# Patient Record
Sex: FEMALE | Race: WHITE | Employment: OTHER | URBAN - METROPOLITAN AREA
[De-identification: names, ages, dates, MRNs, and addresses within clinical notes are randomized per-mention and may not be internally consistent; named-entity substitution may affect disease eponyms.]

---

## 2022-03-10 ENCOUNTER — HOSPITAL ENCOUNTER (OUTPATIENT)
Age: 37
Discharge: HOME | End: 2022-03-10
Payer: MEDICAID

## 2022-03-10 PROCEDURE — 97802 MEDICAL NUTRITION INDIV IN: CPT

## 2022-03-10 NOTE — PROGRESS NOTES
NUTRITION THERAPY ASSESSMENT     Referring Physician: Israel Gomez   PCP: Hilton Hooker  Referring diagnosis: Mast Cell Disease, Malnutrition      Katya Castaneda is a 29 y.o. female w/ PMH of Mast Cell disease, EDS, IBS and GERD. She has an extensive list of allergies d/t Mast Cell, including dairy, milk, banana, peanut, jaycee, palm oil/ coconut oil, soy, nuts, citrus, pork, egg, mushroom, shrimp and clams. Rosalva Robles has had a Mast Cell flare up for the past 3 weeks, she is unable to tolerate most of the things she previously tolerated. We discussed implementing an elemental diet and how it may be beneficial to reduce the risk of allergic reactions. This RD researched elemental formulas, many of them had whey which she is allergic too. She is agreeable to try Vivonex. This is the only elemental formula that was low histamine and free of her allergens. This formula also has significantly more calories per serving than other elemental formulas. This RD provided pt w/ multiple resources about Mast cell. We discussed GI intolerance such as diarrhea and constipation, when she is constipated she has a worse allergic reaction to foods. Pt is still tolerating things such as rice, rice chex, turkey, chicken, hummus and crackers. She is having allergic reactions to most other things. This RD suggested the patient try Vivonex and supplement her diet w/ other foods she is tolerating. Rosalva Robles seems worried that she will not be able to tolerate the foods she was previously. She mentioned having TFs in the future if things do not improve. This RD would like to follow up w/ patient in the future to assess for malnutrition and monitor progress. Anthropometric Measures   Height: 5'4\"  Current Weight: 145 lb, UBW= 130 lb. Possible wt loss over the past few weeks.    BMI- 24.9   18.5-24.9 Normal Weight    Nutrition-focused Physical Findings           Bowel Pattern: Between constipation and diarrhea. Food/Nutrition-Related History  Home Diet: Not specific   Food Allergies: dairy, milk, banana, peanut, jaycee, palm oil/ coconut oil, soy, nuts, citrus, pork, egg, mushroom, shrimp and clams. 24 Hour Diet Recall  Dry rice chex for breakfast  Rice, turkey lunch meat, hummus crackers for lunch  Rice, chicken, grapes (tests another food to test tolerance)    NUTRITION DIAGNOSIS and GOAL  P: Inadequate oral intake  E related to Allergic reactions and GI distress   S: as evidenced by 24 hours recall, allergies, N/V, diarrhea and constipation. Nutrition Intervention:  - Nutrition Education:  Education Methods used: Handouts  - Nutrition Counseling    Individualized Treament Goals:  1. Start elemental ONS, Vivonex formula   2. Once tolerating elemental formula, reintroduce foods to test allergic reactions    Patient encouraged to call RD with any questions. RD available for follow up as needed.      Mandeep Gonzalez MS, RD, LD   Contact Number: Office: [phone]; [address] [place] [address] Road: [phone]

## 2022-04-28 ENCOUNTER — HOSPITAL ENCOUNTER (EMERGENCY)
Age: 37
Discharge: HOME | End: 2022-04-28
Attending: EMERGENCY MEDICINE
Payer: MEDICAID

## 2022-04-28 VITALS
DIASTOLIC BLOOD PRESSURE: 76 MMHG | OXYGEN SATURATION: 100 % | SYSTOLIC BLOOD PRESSURE: 129 MMHG | RESPIRATION RATE: 21 BRPM | HEART RATE: 82 BPM

## 2022-04-28 DIAGNOSIS — T78.2XXA ANAPHYLAXIS, INITIAL ENCOUNTER: Primary | ICD-10-CM

## 2022-04-28 PROCEDURE — 2580000003 HC RX 258: Performed by: NURSE PRACTITIONER

## 2022-04-28 PROCEDURE — A4216 STERILE WATER/SALINE, 10 ML: HCPCS | Performed by: NURSE PRACTITIONER

## 2022-04-28 PROCEDURE — 99284 EMERGENCY DEPT VISIT MOD MDM: CPT

## 2022-04-28 PROCEDURE — 96374 THER/PROPH/DIAG INJ IV PUSH: CPT

## 2022-04-28 PROCEDURE — 6360000002 HC RX W HCPCS: Performed by: NURSE PRACTITIONER

## 2022-04-28 PROCEDURE — 96372 THER/PROPH/DIAG INJ SC/IM: CPT

## 2022-04-28 PROCEDURE — 96375 TX/PRO/DX INJ NEW DRUG ADDON: CPT

## 2022-04-28 PROCEDURE — 2500000003 HC RX 250 WO HCPCS: Performed by: NURSE PRACTITIONER

## 2022-04-28 RX ORDER — DIPHENHYDRAMINE HYDROCHLORIDE 50 MG/ML
50 INJECTION INTRAMUSCULAR; INTRAVENOUS ONCE
Status: COMPLETED | OUTPATIENT
Start: 2022-04-28 | End: 2022-04-28

## 2022-04-28 RX ORDER — EPINEPHRINE 0.3 MG/.3ML
0.3 INJECTION SUBCUTANEOUS ONCE
Status: COMPLETED | OUTPATIENT
Start: 2022-04-28 | End: 2022-04-28

## 2022-04-28 RX ORDER — PREDNISONE 10 MG/1
TABLET ORAL
Qty: 20 TABLET | Refills: 0 | Status: SHIPPED | OUTPATIENT
Start: 2022-04-28 | End: 2022-05-08

## 2022-04-28 RX ORDER — METHYLPREDNISOLONE SODIUM SUCCINATE 125 MG/2ML
125 INJECTION, POWDER, LYOPHILIZED, FOR SOLUTION INTRAMUSCULAR; INTRAVENOUS ONCE
Status: COMPLETED | OUTPATIENT
Start: 2022-04-28 | End: 2022-04-28

## 2022-04-28 RX ORDER — EPINEPHRINE 0.1 MG/ML
SYRINGE (ML) INJECTION
Status: COMPLETED
Start: 2022-04-28 | End: 2022-04-28

## 2022-04-28 RX ADMIN — EPINEPHRINE 0.3 MG: 0.3 INJECTION INTRAMUSCULAR at 20:50

## 2022-04-28 RX ADMIN — METHYLPREDNISOLONE SODIUM SUCCINATE 125 MG: 125 INJECTION, POWDER, FOR SOLUTION INTRAMUSCULAR; INTRAVENOUS at 18:43

## 2022-04-28 RX ADMIN — DIPHENHYDRAMINE HYDROCHLORIDE 50 MG: 50 INJECTION, SOLUTION INTRAMUSCULAR; INTRAVENOUS at 18:39

## 2022-04-28 RX ADMIN — EPINEPHRINE 0.1 MG: 1 INJECTION INTRAMUSCULAR; INTRAVENOUS; SUBCUTANEOUS at 18:41

## 2022-04-28 RX ADMIN — FAMOTIDINE 20 MG: 10 INJECTION, SOLUTION INTRAVENOUS at 18:40

## 2022-04-28 ASSESSMENT — ENCOUNTER SYMPTOMS
EYES NEGATIVE: 1
NAUSEA: 1
SHORTNESS OF BREATH: 0
SORE THROAT: 1

## 2022-04-28 NOTE — ED PROVIDER NOTES
1 [facility]  EMERGENCY DEPARTMENT ENCOUNTER          NURSE PRACTITIONER NOTE       Date of evaluation: 4/28/2022    Chief Complaint     Allergic Reaction      History of Present Illness     Agustín Martinez is a 29 y.o. female with a past medical history of adrenal insufficiency, baldo-danlos, mast cell activation and POTS; who presents to the ED with a c/o an allergic reaction to [employer] fries. Has eaten before without issue, however this evening immediately after eating she developed severe itching, swelling of her tongue, itchiness in her throat and GI upset. Carries an epipen due to h/o anaphylaxis in the past; did not use prior to arrival.  Denies SOB, CP at this time. Review of Systems     Review of Systems   Constitutional: Negative. HENT: Positive for sore throat (\"swelling\"). Eyes: Negative. Respiratory: Negative for shortness of breath. Cardiovascular: Negative. Gastrointestinal: Positive for nausea. Musculoskeletal: Negative. Skin: Positive for rash. Neurological: Negative. Hematological: Does not bruise/bleed easily. Psychiatric/Behavioral: Negative. Past Medical, Surgical, Family, and Social History     She has a past medical history of Adrenal insufficiency ([address] EDS (Baldo-Danlos syndrome), Mast cell activation ([facility] [address] and POTS (postural orthostatic tachycardia syndrome). She has a past surgical history that includes Insertable Cardiac Monitor and Atrial ablation surgery. Her family history is not on file. She reports that she has never smoked. She has never used smokeless tobacco. She reports that she does not drink alcohol.     Medications     Previous Medications    No medications on file       Allergies     She is allergic to citrus, coconut (cocos nucifera) allergy skin test, dairycare [lactase-lactobacillus], eggs or egg-derived products, jaycee flavor, peanut-containing drug products, pear, shellfish-derived products, and soybean-containing drug products. Physical Exam     INITIAL VITALS: BP: (!) 122/92,  , Pulse: 65, Resp: 13, SpO2: 100 %   Physical Exam  Vitals and nursing note reviewed. Constitutional:       Appearance: Normal appearance. HENT:      Head: Normocephalic and atraumatic. Mouth/Throat:      Mouth: Mucous membranes are moist.      Comments: Edema of the posterior oropharynx, swallowing without difficulty, no change in voice  Eyes:      Extraocular Movements: Extraocular movements intact. Pupils: Pupils are equal, round, and reactive to light. Cardiovascular:      Rate and Rhythm: Normal rate and regular rhythm. Pulses: Normal pulses. Heart sounds: Normal heart sounds. Pulmonary:      Effort: Pulmonary effort is normal.      Breath sounds: Normal breath sounds. No wheezing. Musculoskeletal:         General: Normal range of motion. Cervical back: Normal range of motion and neck supple. Skin:     General: Skin is warm and dry. Findings: Erythema and rash present. Neurological:      Mental Status: She is alert and oriented to person, place, and time. Psychiatric:         Mood and Affect: Mood normal.         Behavior: Behavior normal.           Diagnostic Results     RADIOLOGY:  No orders to display       LABS:   No results found for this visit on 04/28/22. RECENT VITALS:  BP: 129/76,  , Pulse: 82, Resp: 21, SpO2: 100 %       ED Course     Nursing Notes, Past Medical Hx, Past Surgical Hx, Social Hx, Allergies, and Family Hx were reviewed.     The patient was given the following medications:  Orders Placed This Encounter   Medications    EPINEPHrine 1 MG/ML injection 0.1 mg    famotidine (PEPCID) 20 mg in sodium chloride (PF) 10 mL injection    diphenhydrAMINE (BENADRYL) injection 50 mg    methylPREDNISolone sodium (SOLU-MEDROL) injection 125 mg    EPINEPHrine 1 MG/10ML injection     Ramonita Nelson: cabinet override    EPINEPHrine (EPIPEN) 0.3 MG/0.3ML injection 0.3 mg    predniSONE (DELTASONE) 10 MG tablet     Sig: Take 4 tablets by mouth once daily for 5 days     Dispense:  20 tablet     Refill:  0            CONSULTS:  None    MEDICAL DECISION MAKING / ASSESSMENT / Kingston Casey is a 29 y.o. female who presents with complaints as noted in HPI. Patient presents to the emergency department with complaint of an allergic reaction to [employer] fries. Symptoms started immediately after ingestion, concern for anaphylactic reaction given multiple body systems involved. Patient is currently hemodynamically stable, afebrile, not hypoxic, appears very uncomfortable. She does have some mild edema of the posterior oropharynx noted, however handling her secretions without difficulty. She was given a dose of IM epinephrine, as well as IV Pepcid, IV Benadryl and IV Solu-Medrol. After 2 hours patient had improvement of her itching, and her nausea however continues to complain of edema of her tongue felt like this was progressively worsening, as well as continued abdominal pain and mild nausea. Patient was given a subsequent dose of epinephrine, and monitored for 2 more hours. Patient had complete relief of her symptoms on reassessment. Patient will be discharged home with a prednisone burst, and instructions to follow-up with her allergist within the next week. Patient verbalized understanding. Patient was given strict return precautions as outlined in the AVS. Patient was agreeable and understanding to this plan of care. This patient was also evaluated by the attending physician. All care plans were discussed and agreed upon. Clinical Impression     1.  Anaphylaxis, initial encounter        Disposition     PATIENT REFERRED TO:  With your allergist      call tomorrow for a follow up appointment      DISCHARGE MEDICATIONS:  New Prescriptions    PREDNISONE (DELTASONE) 10 MG TABLET    Take 4 tablets by mouth once daily for 5 days       DISPOSITION Home in stable condition Jaylon Gonzalez, APRPATSY - CNP  04/28/22 5605

## 2022-04-29 NOTE — ED NOTES
Pt has d/c order. D/C instructions given. Pt verbalized understanding. Pt out to lobby.          Shraddha Ann RN  04/28/22 1069

## 2022-04-29 NOTE — ED PROVIDER NOTES
ED Attending Attestation Note     Date of evaluation: 4/28/2022    This patient was seen by the advance practice provider. I have seen and examined the patient, agree with the workup, evaluation, management and diagnosis. The care plan has been discussed. My assessment reveals patient with mast cell activation and POTS here with allergic reaction to french fries. Tongue swollen on exam but airway OK. Multimodal tx in ED including Epipen. Observed for 4 hours with significant improvement. Critical Care:  Due to the immediate potential for life-threatening deterioration due to anaphylaxis, I spent 35 minutes providing critical care. This time excludes time spent performing procedures but includes time spent on direct patient care, history retrieval, review of the chart, and discussions with patient, family, and consultant(s).        Wilmer Yoon MD  04/29/22 8876

## 2022-05-04 ENCOUNTER — APPOINTMENT (OUTPATIENT)
Dept: GENERAL RADIOLOGY | Age: 37
End: 2022-05-04
Payer: MEDICAID

## 2022-05-04 ENCOUNTER — HOSPITAL ENCOUNTER (EMERGENCY)
Age: 37
Discharge: HOME | End: 2022-05-04
Attending: EMERGENCY MEDICINE
Payer: MEDICAID

## 2022-05-04 VITALS
RESPIRATION RATE: 17 BRPM | WEIGHT: 140 LBS | DIASTOLIC BLOOD PRESSURE: 79 MMHG | SYSTOLIC BLOOD PRESSURE: 136 MMHG | OXYGEN SATURATION: 100 % | HEART RATE: 98 BPM | TEMPERATURE: 99.6 F | HEIGHT: 64 IN | BODY MASS INDEX: 23.9 KG/M2

## 2022-05-04 DIAGNOSIS — T78.2XXA ANAPHYLAXIS, INITIAL ENCOUNTER: Primary | ICD-10-CM

## 2022-05-04 DIAGNOSIS — T78.40XA ALLERGIC REACTION, INITIAL ENCOUNTER: ICD-10-CM

## 2022-05-04 DIAGNOSIS — Z46.59 ENCOUNTER FOR NASOGASTRIC (NG) TUBE PLACEMENT: ICD-10-CM

## 2022-05-04 LAB
A/G RATIO: 1.8 (ref 1.1–2.2)
ALBUMIN SERPL-MCNC: 4.6 G/DL (ref 3.4–5)
ALP BLD-CCNC: 53 U/L (ref 40–129)
ALT SERPL-CCNC: 14 U/L (ref 10–40)
ANION GAP SERPL CALCULATED.3IONS-SCNC: 10 MMOL/L (ref 3–16)
AST SERPL-CCNC: 13 U/L (ref 15–37)
BASOPHILS ABSOLUTE: 0.1 K/UL (ref 0–0.2)
BASOPHILS RELATIVE PERCENT: 0.6 %
BILIRUB SERPL-MCNC: 0.6 MG/DL (ref 0–1)
BUN BLDV-MCNC: 9 MG/DL (ref 7–20)
CALCIUM SERPL-MCNC: 9.9 MG/DL (ref 8.3–10.6)
CHLORIDE BLD-SCNC: 103 MMOL/L (ref 99–110)
CO2: 24 MMOL/L (ref 21–32)
CREAT SERPL-MCNC: 0.8 MG/DL (ref 0.6–1.1)
EOSINOPHILS ABSOLUTE: 0 K/UL (ref 0–0.6)
EOSINOPHILS RELATIVE PERCENT: 0.1 %
GFR AFRICAN AMERICAN: >60
GFR NON-AFRICAN AMERICAN: >60
GLUCOSE BLD-MCNC: 108 MG/DL (ref 70–99)
HCT VFR BLD CALC: 40.2 % (ref 36–48)
HEMOGLOBIN: 13.5 G/DL (ref 12–16)
LYMPHOCYTES ABSOLUTE: 0.6 K/UL (ref 1–5.1)
LYMPHOCYTES RELATIVE PERCENT: 6.4 %
MCH RBC QN AUTO: 32.1 PG (ref 26–34)
MCHC RBC AUTO-ENTMCNC: 33.5 G/DL (ref 31–36)
MCV RBC AUTO: 95.6 FL (ref 80–100)
MONOCYTES ABSOLUTE: 0.1 K/UL (ref 0–1.3)
MONOCYTES RELATIVE PERCENT: 1.6 %
NEUTROPHILS ABSOLUTE: 8.6 K/UL (ref 1.7–7.7)
NEUTROPHILS RELATIVE PERCENT: 91.3 %
PDW BLD-RTO: 14 % (ref 12.4–15.4)
PLATELET # BLD: 251 K/UL (ref 135–450)
PMV BLD AUTO: 9.1 FL (ref 5–10.5)
POTASSIUM SERPL-SCNC: 4.2 MMOL/L (ref 3.5–5.1)
RBC # BLD: 4.2 M/UL (ref 4–5.2)
SODIUM BLD-SCNC: 137 MMOL/L (ref 136–145)
TOTAL PROTEIN: 7.1 G/DL (ref 6.4–8.2)
WBC # BLD: 9.4 K/UL (ref 4–11)

## 2022-05-04 PROCEDURE — 85025 COMPLETE CBC W/AUTO DIFF WBC: CPT

## 2022-05-04 PROCEDURE — 74018 RADEX ABDOMEN 1 VIEW: CPT

## 2022-05-04 PROCEDURE — 96372 THER/PROPH/DIAG INJ SC/IM: CPT

## 2022-05-04 PROCEDURE — 96375 TX/PRO/DX INJ NEW DRUG ADDON: CPT

## 2022-05-04 PROCEDURE — 96374 THER/PROPH/DIAG INJ IV PUSH: CPT

## 2022-05-04 PROCEDURE — 80053 COMPREHEN METABOLIC PANEL: CPT

## 2022-05-04 PROCEDURE — 2580000003 HC RX 258: Performed by: EMERGENCY MEDICINE

## 2022-05-04 PROCEDURE — 6360000002 HC RX W HCPCS: Performed by: EMERGENCY MEDICINE

## 2022-05-04 PROCEDURE — 99285 EMERGENCY DEPT VISIT HI MDM: CPT

## 2022-05-04 RX ORDER — METHYLPREDNISOLONE SODIUM SUCCINATE 125 MG/2ML
125 INJECTION, POWDER, LYOPHILIZED, FOR SOLUTION INTRAMUSCULAR; INTRAVENOUS ONCE
Status: COMPLETED | OUTPATIENT
Start: 2022-05-04 | End: 2022-05-04

## 2022-05-04 RX ORDER — 0.9 % SODIUM CHLORIDE 0.9 %
1000 INTRAVENOUS SOLUTION INTRAVENOUS ONCE
Status: COMPLETED | OUTPATIENT
Start: 2022-05-04 | End: 2022-05-04

## 2022-05-04 RX ORDER — EPINEPHRINE 1 MG/ML
0.5 INJECTION, SOLUTION, CONCENTRATE INTRAVENOUS ONCE
Status: COMPLETED | OUTPATIENT
Start: 2022-05-04 | End: 2022-05-04

## 2022-05-04 RX ORDER — DIPHENHYDRAMINE HYDROCHLORIDE 50 MG/ML
25 INJECTION INTRAMUSCULAR; INTRAVENOUS ONCE
Status: COMPLETED | OUTPATIENT
Start: 2022-05-04 | End: 2022-05-04

## 2022-05-04 RX ADMIN — METHYLPREDNISOLONE SODIUM SUCCINATE 125 MG: 125 INJECTION, POWDER, FOR SOLUTION INTRAMUSCULAR; INTRAVENOUS at 16:23

## 2022-05-04 RX ADMIN — SODIUM CHLORIDE 1000 ML: 9 INJECTION, SOLUTION INTRAVENOUS at 15:34

## 2022-05-04 RX ADMIN — DIPHENHYDRAMINE HYDROCHLORIDE 25 MG: 50 INJECTION, SOLUTION INTRAMUSCULAR; INTRAVENOUS at 16:23

## 2022-05-04 RX ADMIN — EPINEPHRINE 0.5 MG: 1 INJECTION, SOLUTION, CONCENTRATE INTRAVENOUS at 16:53

## 2022-05-04 ASSESSMENT — ENCOUNTER SYMPTOMS
VOMITING: 0
FACIAL SWELLING: 0
VOICE CHANGE: 0
SHORTNESS OF BREATH: 0
COLOR CHANGE: 0
NAUSEA: 0
TROUBLE SWALLOWING: 0
WHEEZING: 0
STRIDOR: 0
ABDOMINAL PAIN: 0

## 2022-05-04 ASSESSMENT — PAIN DESCRIPTION - LOCATION: LOCATION: RIB CAGE

## 2022-05-04 ASSESSMENT — PAIN DESCRIPTION - ORIENTATION: ORIENTATION: LEFT

## 2022-05-04 ASSESSMENT — PAIN - FUNCTIONAL ASSESSMENT: PAIN_FUNCTIONAL_ASSESSMENT: 0-10

## 2022-05-04 ASSESSMENT — PAIN SCALES - GENERAL: PAINLEVEL_OUTOF10: 3

## 2022-05-04 NOTE — ED NOTES
Dr Alaina Collins gave verbal order to discharge patient after receiving medications from allergic reaction. Patient has an Epi pen on hand if needed and is familiar with using this when needed.        Klarissa Astudillo RN  05/04/22 0643

## 2022-05-04 NOTE — ED NOTES
Dr Mauri Segovia at the bedside, he gave verbal order with repeat and verify to stop tube feed and remove ng tube.       Nancy Alcocer RN  05/04/22 1907

## 2022-05-04 NOTE — PROGRESS NOTES
GI Progress Note      SUBJECTIVE:  30yo F with EDS, MCAS and POTS who has manifest allergic reactions to a wide array of foods presented to the ED for NGT placement and subsequent infusion of nutrition to assess the feasibility of enteral nutrition. Despite the use of Benadryl and Solumedrol various sxs to suggest an allergic reaction to the [facility] tube feeds infused via the NGT have manifest    OBJECTIVE      Medications    No current facility-administered medications for this encounter.   Physical    VITALS:  BP (!) 164/112   Pulse 82   Temp 99.6 °F (37.6 °C)   Resp 15   Ht 5' 4\" (1.626 m)   Wt 140 lb (63.5 kg)   LMP 04/20/2022   SpO2 100%   BMI 24.03 kg/m²   ABDL soft, NT, ND, NABs  Data    CBC with Differential:    Lab Results   Component Value Date    WBC 9.4 05/04/2022    RBC 4.20 05/04/2022    HGB 13.5 05/04/2022    HCT 40.2 05/04/2022     05/04/2022    MCV 95.6 05/04/2022    MCH 32.1 05/04/2022    MCHC 33.5 05/04/2022    RDW 14.0 05/04/2022    LYMPHOPCT 6.4 05/04/2022    MONOPCT 1.6 05/04/2022    BASOPCT 0.6 05/04/2022    MONOSABS 0.1 05/04/2022    LYMPHSABS 0.6 05/04/2022    EOSABS 0.0 05/04/2022    BASOSABS 0.1 05/04/2022     CMP:    Lab Results   Component Value Date     05/04/2022    K 4.2 05/04/2022     05/04/2022    CO2 24 05/04/2022    BUN 9 05/04/2022    CREATININE 0.8 05/04/2022    GFRAA >60 05/04/2022    AGRATIO 1.8 05/04/2022    LABGLOM >60 05/04/2022    GLUCOSE 108 05/04/2022    PROT 7.1 05/04/2022    LABALBU 4.6 05/04/2022    CALCIUM 9.9 05/04/2022    BILITOT 0.6 05/04/2022    ALKPHOS 53 05/04/2022    AST 13 05/04/2022    ALT 14 05/04/2022       ASSESSMENT AND PLAN  Placement of NGT with subsequent infusion of nutrition was not well tolerated depsite Benadryl and Solumedrol  - TFs to stop  - NGT to be removed  - Pt will be followed in ED until reaction has resolved  - I will discuss next steps with Dr Heraclio Fair

## 2022-05-04 NOTE — ED NOTES
Tube feed is stopped and fluid is removed from abd. Ng tube removed. Patient tongue appears swollen and Dr Kendall Kat is updated.       Elie Cooper RN  05/04/22 7583

## 2022-05-04 NOTE — ED NOTES
Patient's face is red and tongue to swollen, pt is not able to speak in full sentences. Im epi is given to patient. Will continue to monitor.       Jocelyne Cedillo RN  05/04/22 6813

## 2022-05-04 NOTE — ED NOTES
Patient feels like her lips are \"a little tingly but I feel ok\" Dr. Melissa Taveras is updated. He is calling Dr Reji Diallo for update.       Diann Auguste RN  05/04/22 8735

## 2022-05-04 NOTE — ED NOTES
Patient tongue swelling has decreased she is able to speak and reports feeling less swollen. Facial redness has decreased. Pt remains alert and oriented.       Martin German RN  05/04/22 0931

## 2022-05-04 NOTE — ED NOTES
Kub is read by radiologist and tube placement is correct. Tube feed is begun, running 60 ml/hr on Kangaroo pump. Pt has call light and sister is at the bedside. Pt remains hooked to cardiac monitor. Pt is alert and oriented. Crash cart is at the bedside. Pt is alert and oriented. Will continue to monitor.       Armida Perea RN  05/04/22 1200

## 2022-05-04 NOTE — ED NOTES
Dr Isabela Locke is at the bed side. Orders are placed for epi to be given to stop reaction.       Sandra Linder RN  05/04/22 0862

## 2022-05-04 NOTE — ED NOTES
[address] farm tube feed continues to infuse. Pt denies any itching or shortness of breath. Will continue to monitor.       Seble Centeno RN  05/04/22 9364

## 2022-05-04 NOTE — ED PROVIDER NOTES
[address] [facility]  ED  eMERGENCY dEPARTMENT eNCOUnter      Pt Name: Lori Metzger  MRN: [id number]  [place] 1985  Date of evaluation: 5/4/2022  Provider: Caron Godinez MD    [address] [place] [address]       Chief Complaint   Patient presents with    G Tube Complications     pt states she is here to have G-tube replaced;states Dr Fidel Cárdenas to see her in ER       HISTORY OF PRESENT ILLNESS   (Location/Symptom, Timing/Onset, Context/Setting, Quality, Duration, Modifying Factors, Severity)  Note limiting factors. Lori Metzger is a 29 y.o. female with significant history of mast cell activation with numerous food allergies who is being followed by Dr. Fidel Cárdenas with gastroenterology. Patient has failed numerous diets as well as NG tube feeds. The patient is being evaluated for possible need for TPN however in an effort to avoid this patient is coming to the emergency department to have an NG tube placed and to attempt an NG tube feed. The patient was pretreated herself with Benadryl. She denies any fever, trouble breathing, or infectious symptoms at this time. She was her allergy symptoms are severe constant and gets worse with any food intake. HPI    Nursing Notes were reviewed. REVIEW OFSYSTEMS    (2-9 systems for level 4, 10 or more for level 5)     Review of Systems   Constitutional: Negative for appetite change, fever and unexpected weight change. HENT: Negative for facial swelling, trouble swallowing and voice change. Eyes: Negative for visual disturbance. Respiratory: Negative for shortness of breath, wheezing and stridor. Cardiovascular: Negative for chest pain and palpitations. Gastrointestinal: Negative for abdominal pain, nausea and vomiting. Genitourinary: Negative for dysuria and vaginal bleeding. Musculoskeletal: Negative for neck pain and neck stiffness. Skin: Negative for color change and wound. Neurological: Negative for seizures and syncope.    Psychiatric/Behavioral: Negative for self-injury and suicidal ideas. Except as noted above the remainder of the review of systems was reviewed and negative. PAST MEDICAL HISTORY     Past Medical History:   Diagnosis Date    Adrenal insufficiency ([address]     EDS (Baldo-Danlos syndrome)     Mast cell activation (HCC)     POTS (postural orthostatic tachycardia syndrome)          SURGICAL HISTORY       Past Surgical History:   Procedure Laterality Date    ATRIAL ABLATION SURGERY      INSERTABLE CARDIAC MONITOR           CURRENT MEDICATIONS       Discharge Medication List as of 5/4/2022  5:36 PM      CONTINUE these medications which have NOT CHANGED    Details   predniSONE (DELTASONE) 10 MG tablet Take 4 tablets by mouth once daily for 5 days, Disp-20 tablet, R-0Print             ALLERGIES     Citrus, Coconut (cocos nucifera) allergy skin test, Dairycare [lactase-lactobacillus], Eggs or egg-derived products, [place] flavor, Peanut-containing drug products, Pear, Shellfish-derived products, and Soybean-containing drug products    FAMILY HISTORY     History reviewed. No pertinent family history. SOCIAL HISTORY       Social History     Socioeconomic History    Marital status: Single     Spouse name: None    Number of children: None    Years of education: None    Highest education level: None   Occupational History    None   Tobacco Use    Smoking status: Never Smoker    Smokeless tobacco: Never Used   Substance and Sexual Activity    Alcohol use: Never    Drug use: None    Sexual activity: None   Other Topics Concern    None   Social History Narrative    None     Social Determinants of Health     Financial Resource Strain:     Difficulty of Paying Living Expenses: Not on file   Food Insecurity:     Worried About Running Out of Food in the Last Year: Not on file    Caitlyn of Food in the Last Year: Not on file   Transportation Needs:     Lack of Transportation (Medical):  Not on file    Lack of Transportation (Non-Medical): Not on file   Physical Activity:     Days of Exercise per Week: Not on file    Minutes of Exercise per Session: Not on file   Stress:     Feeling of Stress : Not on file   Social Connections:     Frequency of Communication with Friends and Family: Not on file    Frequency of Social Gatherings with Friends and Family: Not on file    Attends Rastafarian Services: Not on file    Active Member of [address] [place] [address] or Organizations: Not on file    Attends Club or Organization Meetings: Not on file    Marital Status: Not on file   Intimate Partner Violence:     Fear of Current or Ex-Partner: Not on file    Emotionally Abused: Not on file    Physically Abused: Not on file    Sexually Abused: Not on file   Housing Stability:     Unable to Pay for Housing in the Last Year: Not on file    Number of [place] in the Last Year: Not on file    Unstable Housing in the Last Year: Not on file         PHYSICAL EXAM    (up to 7 for level 4, 8 or more for level 5)     ED Triage Vitals   BP Temp Temp src Pulse Resp SpO2 Height Weight   05/04/22 1219 05/04/22 1219 -- 05/04/22 1218 05/04/22 1219 05/04/22 1218 05/04/22 1219 05/04/22 1219   (!) 150/102 99.6 °F (37.6 °C)  76 18 97 % 5' 4\" (1.626 m) 140 lb (63.5 kg)       Physical Exam  Vitals and nursing note reviewed. Constitutional:       Appearance: She is well-developed. She is not diaphoretic. HENT:      Head: Normocephalic and atraumatic. Right Ear: External ear normal.      Left Ear: External ear normal.   Eyes:      Conjunctiva/sclera: Conjunctivae normal.   Neck:      Vascular: No JVD. Trachea: No tracheal deviation. Cardiovascular:      Rate and Rhythm: Normal rate. Pulmonary:      Effort: Pulmonary effort is normal. No respiratory distress. Breath sounds: Normal breath sounds. No wheezing. Abdominal:      General: There is no distension. Palpations: Abdomen is soft. Tenderness: There is no abdominal tenderness.  There is no guarding or rebound. Musculoskeletal:         General: No tenderness or deformity. Normal range of motion. Cervical back: Neck supple. Skin:     General: Skin is warm and dry. Neurological:      Mental Status: She is alert. Cranial Nerves: No cranial nerve deficit. DIAGNOSTIC RESULTS     RADIOLOGY:     Interpretation per the Radiologist below, if available at the time of this note:    XR ABDOMEN (KUB) (SINGLE AP VIEW)   Final Result   Satisfactory NG tube placement. Fecal retention pattern. ED BEDSIDE ULTRASOUND:   Performed by ED Physician - none    LABS:  Labs Reviewed   CBC WITH AUTO DIFFERENTIAL - Abnormal; Notable for the following components:       Result Value    Neutrophils Absolute 8.6 (*)     Lymphocytes Absolute 0.6 (*)     All other components within normal limits   COMPREHENSIVE METABOLIC PANEL - Abnormal; Notable for the following components:    Glucose 108 (*)     AST 13 (*)     All other components within normal limits       All otherlabs were within normal range or not returned as of this dictation. EMERGENCY DEPARTMENT COURSE and DIFFERENTIAL DIAGNOSIS/MDM:   Vitals:    Vitals:    05/04/22 1723 05/04/22 1731 05/04/22 1732 05/04/22 1736   BP: (!) 142/93   136/79   Pulse: 99 95 95 98   Resp: 23 28 20 17   Temp:       SpO2: 100% 100% 100% 100%   Weight:       Height:             MDM  All vital signs are within normal limits except for very mild hypertension. NG tube placed and is confirmed with x-ray. Baseline labs obtained and are unremarkable. Special hypoallergenic tube feed is attempted and patient is closely monitored however patient develops tingliness of lips and 10 minutes after this is noted to have erythema and swelling of her maxillary face as well as mild angioedema with swelling of the tongue. Patient also reports she is feeling like her throat is closing.   Tube feed is terminated, NG tube is removed after tube feed remnants are suctioned from the stomach. Patient is given Benadryl and Solu-Medrol however upon reevaluation her symptoms have not significantly improved and therefore patient is given IM epinephrine. Patient is observed after this and does have substantial improvement and near complete resolution of all symptoms. I have offered to observe the patient further however she requested be discharged home stating that she has EpiPen's on her person and will come back to the emergency department if her allergic reactions worsen. Patient is now breathing speaking normally. She will follow-up with Dr. Radha Oscar as an outpatient. Dr. Radha Oscar does come to the emergency department to evaluate the patient in person and witnesses this failed NG tube feed trial.  Patient is closely monitored throughout her stay in the emerged part with improvement in symptoms after requiring IM epinephrine. CONSULTS:  Gastroenterology - Dr. Maria Antonia Marte:  Unless otherwise noted below, none     Critical Care  Performed by: Radha Peng MD  Authorized by: Radha Peng MD     Critical care provider statement:     Critical care time (minutes):  30    Critical care time was exclusive of:  Separately billable procedures and treating other patients and teaching time    Critical care was necessary to treat or prevent imminent or life-threatening deterioration of the following conditions:  Respiratory failure and shock (anaphylaxsis requiring epinephrine)    Critical care was time spent personally by me on the following activities:  Ordering and performing treatments and interventions, development of treatment plan with patient or surrogate, ordering and review of laboratory studies, discussions with consultants, ordering and review of radiographic studies, evaluation of patient's response to treatment, re-evaluation of patient's condition, examination of patient and obtaining history from patient or surrogate        FINAL IMPRESSION      1.  Anaphylaxis, initial encounter    2. Encounter for nasogastric (NG) tube placement    3. Allergic reaction, initial encounter          DISPOSITION/PLAN   DISPOSITION Decision To Discharge 05/04/2022 05:24:18 PM      PATIENT REFERRED TO:  Barry Menezes MD  [address] [place] [address] [phone]    In 1 week      [facility]  ED  [address] [facility] [id number] [phone]    If symptoms worsen      (Please note that portions of this note were completed with a voice recognition program.  Efforts were made to edit the dictations but occasionally words aremis-transcribed. )    Ena Contreras MD (electronically signed)  Attending Emergency Physician           Ena Contreras MD  05/04/22 9545

## 2024-09-25 ENCOUNTER — HOSPITAL ENCOUNTER (OUTPATIENT)
Age: 39
Setting detail: OUTPATIENT SURGERY
Discharge: HOME | End: 2024-09-25
Attending: INTERNAL MEDICINE | Admitting: INTERNAL MEDICINE
Payer: MEDICAID

## 2024-09-25 ENCOUNTER — ANESTHESIA EVENT (OUTPATIENT)
Dept: ENDOSCOPY | Age: 39
End: 2024-09-25
Payer: MEDICAID

## 2024-09-25 ENCOUNTER — ANESTHESIA (OUTPATIENT)
Dept: ENDOSCOPY | Age: 39
End: 2024-09-25
Payer: MEDICAID

## 2024-09-25 VITALS
HEART RATE: 100 BPM | DIASTOLIC BLOOD PRESSURE: 98 MMHG | SYSTOLIC BLOOD PRESSURE: 145 MMHG | WEIGHT: 155 LBS | BODY MASS INDEX: 26.46 KG/M2 | HEIGHT: 64 IN | RESPIRATION RATE: 20 BRPM | TEMPERATURE: 99 F | OXYGEN SATURATION: 92 %

## 2024-09-25 PROCEDURE — 7100000011 HC PHASE II RECOVERY - ADDTL 15 MIN: Performed by: INTERNAL MEDICINE

## 2024-09-25 PROCEDURE — 3609008400 HC SIGMOIDOSCOPY DIAGNOSTIC: Performed by: INTERNAL MEDICINE

## 2024-09-25 PROCEDURE — 2709999900 HC NON-CHARGEABLE SUPPLY: Performed by: INTERNAL MEDICINE

## 2024-09-25 PROCEDURE — 6360000002 HC RX W HCPCS: Performed by: INTERNAL MEDICINE

## 2024-09-25 PROCEDURE — 7100000010 HC PHASE II RECOVERY - FIRST 15 MIN: Performed by: INTERNAL MEDICINE

## 2024-09-25 RX ORDER — ONDANSETRON 4 MG/1
4 TABLET, FILM COATED ORAL EVERY 8 HOURS PRN
COMMUNITY

## 2024-09-25 RX ORDER — ONDANSETRON 2 MG/ML
4 INJECTION INTRAMUSCULAR; INTRAVENOUS
Status: CANCELLED | OUTPATIENT
Start: 2024-09-25 | End: 2024-09-26

## 2024-09-25 RX ORDER — NALOXONE HYDROCHLORIDE 0.4 MG/ML
INJECTION, SOLUTION INTRAMUSCULAR; INTRAVENOUS; SUBCUTANEOUS PRN
Status: CANCELLED | OUTPATIENT
Start: 2024-09-25

## 2024-09-25 RX ORDER — LORAZEPAM 2 MG/ML
0.5 INJECTION INTRAMUSCULAR
Status: CANCELLED | OUTPATIENT
Start: 2024-09-25 | End: 2024-09-26

## 2024-09-25 RX ORDER — SODIUM CHLORIDE 0.9 % (FLUSH) 0.9 %
5-40 SYRINGE (ML) INJECTION EVERY 12 HOURS SCHEDULED
Status: CANCELLED | OUTPATIENT
Start: 2024-09-25

## 2024-09-25 RX ORDER — GABAPENTIN 600 MG/1
600 TABLET ORAL 3 TIMES DAILY
COMMUNITY

## 2024-09-25 RX ORDER — ASCORBIC ACID 500 MG
100 TABLET ORAL DAILY
COMMUNITY

## 2024-09-25 RX ORDER — NORTRIPTYLINE HCL 10 MG
10 CAPSULE ORAL NIGHTLY
COMMUNITY

## 2024-09-25 RX ORDER — SODIUM CHLORIDE 9 MG/ML
INJECTION, SOLUTION INTRAVENOUS PRN
Status: CANCELLED | OUTPATIENT
Start: 2024-09-25

## 2024-09-25 RX ORDER — OXYCODONE HYDROCHLORIDE 5 MG/1
5 TABLET ORAL
Status: CANCELLED | OUTPATIENT
Start: 2024-09-25 | End: 2024-09-26

## 2024-09-25 RX ORDER — SUMATRIPTAN 100 MG/1
100 TABLET, FILM COATED ORAL
COMMUNITY

## 2024-09-25 RX ORDER — DICYCLOMINE HCL 20 MG
20 TABLET ORAL EVERY 6 HOURS
COMMUNITY

## 2024-09-25 RX ORDER — LEVALBUTEROL TARTRATE 45 UG/1
1-2 AEROSOL, METERED ORAL EVERY 4 HOURS PRN
COMMUNITY

## 2024-09-25 RX ORDER — HYDROXYCHLOROQUINE SULFATE 200 MG/1
200 TABLET, FILM COATED ORAL DAILY
COMMUNITY

## 2024-09-25 RX ORDER — LORATADINE 10 MG/1
10 TABLET ORAL DAILY
COMMUNITY

## 2024-09-25 RX ORDER — DIPHENHYDRAMINE HYDROCHLORIDE 50 MG/ML
12.5 INJECTION INTRAMUSCULAR; INTRAVENOUS
Status: CANCELLED | OUTPATIENT
Start: 2024-09-25 | End: 2024-09-26

## 2024-09-25 RX ORDER — FAMOTIDINE 40 MG/1
40 TABLET, FILM COATED ORAL DAILY
COMMUNITY

## 2024-09-25 RX ORDER — MEPERIDINE HYDROCHLORIDE 50 MG/ML
12.5 INJECTION INTRAMUSCULAR; INTRAVENOUS; SUBCUTANEOUS EVERY 5 MIN PRN
Status: CANCELLED | OUTPATIENT
Start: 2024-09-25

## 2024-09-25 RX ORDER — PREDNISONE 10 MG/1
10 TABLET ORAL DAILY
COMMUNITY

## 2024-09-25 RX ORDER — 0.9 % SODIUM CHLORIDE 0.9 %
INTRAVENOUS SOLUTION INTRAVENOUS
COMMUNITY

## 2024-09-25 RX ORDER — LABETALOL HYDROCHLORIDE 5 MG/ML
10 INJECTION, SOLUTION INTRAVENOUS
Status: CANCELLED | OUTPATIENT
Start: 2024-09-25

## 2024-09-25 RX ORDER — HUMAN C1-ESTERASE INHIBITOR 2000 UNIT
4000 KIT SUBCUTANEOUS
COMMUNITY

## 2024-09-25 RX ORDER — DOXEPIN HYDROCHLORIDE 10 MG/1
30 CAPSULE ORAL NIGHTLY
COMMUNITY

## 2024-09-25 RX ORDER — AMOXICILLIN 250 MG
1 CAPSULE ORAL DAILY
COMMUNITY

## 2024-09-25 RX ORDER — MECLIZINE HCL 12.5 MG 12.5 MG/1
12.5 TABLET ORAL 3 TIMES DAILY PRN
COMMUNITY

## 2024-09-25 RX ORDER — SODIUM CHLORIDE 0.9 % (FLUSH) 0.9 %
5-40 SYRINGE (ML) INJECTION PRN
Status: CANCELLED | OUTPATIENT
Start: 2024-09-25

## 2024-09-25 RX ORDER — PANTOPRAZOLE SODIUM 40 MG/1
40 FOR SUSPENSION ORAL
COMMUNITY

## 2024-09-25 RX ORDER — DIPHENHYDRAMINE HCL 12.5MG/5ML
50 LIQUID (ML) ORAL 4 TIMES DAILY PRN
COMMUNITY

## 2024-09-25 RX ORDER — PROMETHAZINE HYDROCHLORIDE 12.5 MG/1
12.5 TABLET ORAL EVERY 6 HOURS PRN
COMMUNITY

## 2024-09-25 RX ORDER — METAXALONE 800 MG/1
800 TABLET ORAL 2 TIMES DAILY
COMMUNITY

## 2024-09-25 RX ORDER — PROCHLORPERAZINE EDISYLATE 5 MG/ML
5 INJECTION INTRAMUSCULAR; INTRAVENOUS
Status: CANCELLED | OUTPATIENT
Start: 2024-09-25 | End: 2024-09-26

## 2024-09-25 RX ORDER — FLUTICASONE PROPIONATE AND SALMETEROL XINAFOATE 45; 21 UG/1; UG/1
2 AEROSOL, METERED RESPIRATORY (INHALATION) 2 TIMES DAILY
COMMUNITY

## 2024-09-25 RX ORDER — DIPHENHYDRAMINE HYDROCHLORIDE 50 MG/ML
50 INJECTION INTRAMUSCULAR; INTRAVENOUS ONCE
Status: COMPLETED | OUTPATIENT
Start: 2024-09-25 | End: 2024-09-25

## 2024-09-25 RX ORDER — HYDROXYZINE PAMOATE 25 MG/1
25 CAPSULE ORAL 3 TIMES DAILY PRN
COMMUNITY

## 2024-09-25 RX ORDER — HYDROMORPHONE HYDROCHLORIDE 2 MG/1
2 TABLET ORAL
COMMUNITY

## 2024-09-25 RX ADMIN — DIPHENHYDRAMINE HYDROCHLORIDE 50 MG: 50 INJECTION INTRAMUSCULAR; INTRAVENOUS at 13:30

## 2024-09-25 ASSESSMENT — PAIN SCALES - GENERAL
PAINLEVEL_OUTOF10: 0
PAINLEVEL_OUTOF10: 0

## 2024-09-25 ASSESSMENT — PAIN - FUNCTIONAL ASSESSMENT: PAIN_FUNCTIONAL_ASSESSMENT: 0-10

## 2024-09-25 ASSESSMENT — PAIN DESCRIPTION - DESCRIPTORS: DESCRIPTORS: ACHING

## (undated) DEVICE — AIRLIFE™ NASAL OXYGEN CANNULA CURVED, NONFLARED TIP, WITH 7' FEET (2.1 M) CRUSH RESISTANT TUBING, OVER-THE-EAR STYLE: Brand: AIRLIFE™

## (undated) DEVICE — Z DISCONTINUED USE 2276105 GOWN PROTCT UNIV CHST W28IN L49IN SL 24IN BLU SPUNBOND FLM

## (undated) DEVICE — CANNULA NSL L4M O2 AD FILTERLINE

## (undated) DEVICE — KIT INF CTRL 2OZ LUB TBNG L12FT DBL END BRSH SYR OP4

## (undated) DEVICE — ELECTRODE,ECG,STRESS,FOAM,3PK: Brand: MEDLINE